# Patient Record
(demographics unavailable — no encounter records)

---

## 2022-06-03 PROBLEM — E55.9 HYPOVITAMINOSIS D: Status: ACTIVE | Noted: 2022-06-03

## 2022-06-03 PROBLEM — L30.4 INTERTRIGINOUS DERMATITIS ASSOCIATED WITH MOISTURE: Status: ACTIVE | Noted: 2022-06-03
